# Patient Record
Sex: MALE | Race: WHITE | NOT HISPANIC OR LATINO | Employment: UNEMPLOYED | URBAN - NONMETROPOLITAN AREA
[De-identification: names, ages, dates, MRNs, and addresses within clinical notes are randomized per-mention and may not be internally consistent; named-entity substitution may affect disease eponyms.]

---

## 2020-03-09 ENCOUNTER — HOSPITAL ENCOUNTER (EMERGENCY)
Facility: HOSPITAL | Age: 25
Discharge: HOME | End: 2020-03-09
Attending: STUDENT IN AN ORGANIZED HEALTH CARE EDUCATION/TRAINING PROGRAM | Admitting: EMERGENCY MEDICINE

## 2020-03-09 VITALS
BODY MASS INDEX: 24.64 KG/M2 | SYSTOLIC BLOOD PRESSURE: 136 MMHG | HEIGHT: 74 IN | OXYGEN SATURATION: 96 % | WEIGHT: 192 LBS | DIASTOLIC BLOOD PRESSURE: 68 MMHG | RESPIRATION RATE: 19 BRPM | HEART RATE: 83 BPM | TEMPERATURE: 98.6 F

## 2020-03-09 DIAGNOSIS — S01.81XA CHIN LACERATION, INITIAL ENCOUNTER: Primary | ICD-10-CM

## 2020-03-09 PROCEDURE — 99282 EMERGENCY DEPT VISIT SF MDM: CPT

## 2020-03-09 RX ORDER — LIDOCAINE HYDROCHLORIDE 10 MG/ML
10 INJECTION, SOLUTION EPIDURAL; INFILTRATION; INTRACAUDAL; PERINEURAL ONCE
Status: COMPLETED | OUTPATIENT
Start: 2020-03-09 | End: 2020-03-09

## 2020-03-09 RX ADMIN — LIDOCAINE HYDROCHLORIDE 10 ML: 10 INJECTION, SOLUTION EPIDURAL; INFILTRATION; INTRACAUDAL; PERINEURAL at 19:30

## 2020-03-09 NOTE — ED TRIAGE NOTES
Pt was using a spring loaded ab roller that failed and hit him in the chin. Pt has a lac to chin approx 3x1 cm and complains of jaw pain. Bleeding controlled at this time.

## 2020-03-10 NOTE — ED PROVIDER NOTES
Subjective   24-year-old male presenting for laceration on the chin.  He was working out using a spring-loaded ab roller when the spring broke and he fell hitting his chin on the floor.  He applied pressure and minimal bleeding after 5 minutes.  He endorses chin and jaw pain that is 3/10 in intensity and aching in quality.          Review of Systems   Constitutional: Negative for chills and fever.   HENT: Negative for congestion, rhinorrhea and sinus pain.    Eyes: Negative for pain and redness.   Respiratory: Negative for cough and shortness of breath.    Cardiovascular: Negative for chest pain and palpitations.   Gastrointestinal: Negative for abdominal pain, nausea and vomiting.   Genitourinary: Negative for dysuria, flank pain and hematuria.   Musculoskeletal: Negative for arthralgias, joint swelling, myalgias and neck pain.   Skin: Positive for wound (Chin). Negative for color change, pallor and rash.   Neurological: Negative for light-headedness and headaches.       History reviewed. No pertinent past medical history.    No Known Allergies    History reviewed. No pertinent surgical history.    History reviewed. No pertinent family history.    Social History     Socioeconomic History   • Marital status: Single     Spouse name: Not on file   • Number of children: Not on file   • Years of education: Not on file   • Highest education level: Not on file   Tobacco Use   • Smoking status: Unknown If Ever Smoked           Objective   Physical Exam   Constitutional: He is oriented to person, place, and time. He appears well-developed and well-nourished. No distress.   HENT:   Head: Normocephalic. Head is with laceration.   3 cm laceration of the chin.   Eyes: Conjunctivae and EOM are normal.   Cardiovascular: Normal rate and regular rhythm.   Pulmonary/Chest: Effort normal and breath sounds normal.   Abdominal: Soft. Bowel sounds are normal. He exhibits no distension. There is no tenderness.   Musculoskeletal: Normal  range of motion. He exhibits no edema, tenderness or deformity.   Neurological: He is alert and oriented to person, place, and time.   Skin: He is not diaphoretic.   Nursing note and vitals reviewed.      Laceration Repair  Date/Time: 3/9/2020 7:34 PM  Performed by: James Morton MD  Authorized by: Jorge Alberto Holden MD     Consent:     Consent obtained:  Verbal    Consent given by:  Patient    Risks discussed:  Infection, need for additional repair, pain and poor cosmetic result  Anesthesia (see MAR for exact dosages):     Anesthesia method:  Local infiltration    Local anesthetic:  Lidocaine 1% w/o epi  Laceration details:     Location:  Face    Face location:  Chin    Length (cm):  3    Depth (mm):  1  Repair type:     Repair type:  Simple  Pre-procedure details:     Preparation:  Patient was prepped and draped in usual sterile fashion  Exploration:     Hemostasis achieved with:  Direct pressure    Wound extent: areolar tissue violated and fascia violated      Wound extent: no muscle damage noted, no nerve damage noted, no tendon damage noted and no vascular damage noted    Treatment:     Area cleansed with:  Saline    Amount of cleaning:  Standard    Irrigation method:  Pressure wash  Skin repair:     Repair method:  Sutures    Suture size:  4-0    Suture material:  Nylon    Suture technique:  Simple interrupted    Number of sutures:  5  Post-procedure details:     Dressing:  Antibiotic ointment               ED Course  ED Course as of Mar 09 2104   Mon Mar 09, 2020   1934 Wound cleaned with saline and chlorhexidine.  Lidocaine injected.  5 sutures placed.    [HM]      ED Course User Index  [HM] James Morton MD                                           [facility]  Number of Diagnoses or Management Options  Chin laceration, initial encounter:   Diagnosis management comments: 24-year-old male presenting for laceration of the chin.  Laceration approximately 3 cm long and 1 cm deep.  Area cleaned with  chlorhexidine and saline.  4-0 nylon sutures used to repair the laceration.  Patient given precautions to keep the area clean and to look for signs of infection.  Sutures to be removed in 7 to 10 days.  Patient to follow-up with PCP, offered for him to see me in clinic.      Final diagnoses:   Chin laceration, initial encounter            aJmes Morton MD  Resident  03/09/20 [id number]

## 2020-03-17 ENCOUNTER — OFFICE VISIT (OUTPATIENT)
Dept: FAMILY MEDICINE CLINIC | Facility: CLINIC | Age: 25
End: 2020-03-17

## 2020-03-17 VITALS
TEMPERATURE: 97.7 F | HEART RATE: 52 BPM | BODY MASS INDEX: 24.68 KG/M2 | SYSTOLIC BLOOD PRESSURE: 118 MMHG | WEIGHT: 192.3 LBS | HEIGHT: 74 IN | DIASTOLIC BLOOD PRESSURE: 80 MMHG | OXYGEN SATURATION: 100 %

## 2020-03-17 DIAGNOSIS — S01.81XA CHIN LACERATION, INITIAL ENCOUNTER: Primary | ICD-10-CM

## 2020-03-17 DIAGNOSIS — F32.9 REACTIVE DEPRESSION: ICD-10-CM

## 2020-03-17 PROCEDURE — 99203 OFFICE O/P NEW LOW 30 MIN: CPT | Performed by: STUDENT IN AN ORGANIZED HEALTH CARE EDUCATION/TRAINING PROGRAM

## 2020-03-17 NOTE — PROGRESS NOTES
Family Medicine Residency  James Morton MD    Subjective:     Tommy Barrett is a 24 y.o. male who presents for establish care and suture removal.  Patient was seen at the ED a week ago after suffering a laceration to his chin while working out.  He was given bacitracin cream and denies any signs of infection.  He is in [place] on court mandated rehab due to alcohol and multiple drug use.  He endorses symptoms of major depression.  States the symptoms are related to the fact that he was arrested 8 months ago is now in rehab.  He lost his house and job in [place] and is uncertain of what work you will do afterwards.  He is currently at [facility] and sees a therapist there that he likes.    PHQ-9 Depression Screening  Little interest or pleasure in doing things? 3   Feeling down, depressed, or hopeless? 3   Trouble falling or staying asleep, or sleeping too much? 3   Feeling tired or having little energy? 3   Poor appetite or overeating? 0   Feeling bad about yourself - or that you are a failure or have let yourself or your family down? 3   Trouble concentrating on things, such as reading the newspaper or watching television? 3   Moving or speaking so slowly that other people could have noticed? Or the opposite - being so fidgety or restless that you have been moving around a lot more than usual? 3   Thoughts that you would be better off dead, or of hurting yourself in some way? 3   PHQ-9 Total Score 24   If you checked off any problems, how difficult have these problems made it for you to do your work, take care of things at home, or get along with other people? Extremely dIfficult         The following portions of the patient's history were reviewed and updated as appropriate: allergies, current medications, past family history, past medical history, past social history, past surgical history and problem list.    Past Medical Hx:  Past Medical History:   Diagnosis Date   • ADHD (attention deficit  hyperactivity disorder)        Past Surgical Hx:  Past Surgical History:   Procedure Laterality Date   • WISDOM TOOTH EXTRACTION         Current Meds:  No current outpatient medications on file.    Allergies:  No Known Allergies    Family Hx:  Family History   Problem Relation Age of Onset   • No Known Problems Mother    • No Known Problems Father    • No Known Problems Sister    • No Known Problems Brother    • No Known Problems Daughter    • No Known Problems Son    • No Known Problems Maternal Aunt    • No Known Problems Maternal Uncle    • No Known Problems Paternal Aunt    • No Known Problems Paternal Uncle    • Stroke Maternal Grandmother    • No Known Problems Maternal Grandfather    • No Known Problems Paternal Grandmother    • No Known Problems Paternal Grandfather    • No Known Problems Other         Social History:  Social History     Socioeconomic History   • Marital status: Single     Spouse name: Not on file   • Number of children: Not on file   • Years of education: Not on file   • Highest education level: Not on file   Tobacco Use   • Smoking status: Never Smoker   • Smokeless tobacco: Never Used   Substance and Sexual Activity   • Alcohol use: Yes   • Drug use: Not Currently     Types: Marijuana   • Sexual activity: Not Currently       Review of Systems  Review of Systems   Constitutional: Negative for appetite change, chills, diaphoresis, fatigue, fever and unexpected weight change.   HENT: Negative for congestion, rhinorrhea, sinus pain and sore throat.    Eyes: Negative for pain and redness.   Respiratory: Negative for cough, chest tightness and shortness of breath.    Cardiovascular: Negative for chest pain, palpitations and leg swelling.   Gastrointestinal: Negative for abdominal pain, constipation, diarrhea, nausea and vomiting.   Endocrine: Negative for polydipsia, polyphagia and polyuria.   Genitourinary: Negative for difficulty urinating and dysuria.   Musculoskeletal: Negative for  "arthralgias, joint swelling and myalgias.   Skin: Positive for wound (chin laceration). Negative for color change, pallor and rash.   Neurological: Negative for dizziness, light-headedness and headaches.   Psychiatric/Behavioral: Positive for dysphoric mood and sleep disturbance. Negative for agitation and behavioral problems. The patient is not nervous/anxious.        Objective:     /80   Pulse 52   Temp 97.7 °F (36.5 °C) (Temporal)   Ht 188 cm (74\")   Wt 87.2 kg (192 lb 4.8 oz)   SpO2 100%   BMI 24.69 kg/m²   Physical Exam   Constitutional: He is oriented to person, place, and time. Vital signs are normal. He appears well-developed and well-nourished. No distress.   HENT:   Head: Normocephalic. Head is with laceration.   Right Ear: Hearing normal.   Left Ear: Hearing normal.   Eyes: Pupils are equal, round, and reactive to light. Conjunctivae, EOM and lids are normal.   Neck: Normal range of motion. Neck supple.   Cardiovascular: Normal rate, regular rhythm, S1 normal, S2 normal and normal heart sounds.   No murmur heard.  Pulmonary/Chest: Effort normal and breath sounds normal. No respiratory distress. He has no wheezes. He has no rales.   Abdominal: Soft. Normal appearance and bowel sounds are normal. He exhibits no distension. There is no tenderness. There is no guarding.   Musculoskeletal: Normal range of motion. He exhibits no edema, tenderness or deformity.   Lymphadenopathy:     He has no cervical adenopathy.   Neurological: He is alert and oriented to person, place, and time. He has normal strength. He is not disoriented.   Skin: Skin is warm and dry. Laceration noted. No rash noted. He is not diaphoretic. No erythema. No pallor.   Psychiatric: He has a normal mood and affect. His speech is normal and behavior is normal. Cognition and memory are normal.        Assessment/Plan:     Tommy was seen today for establish care and suture / staple removal.    Diagnoses and all orders for this " visit:    Chin laceration, initial encounter  -3 cm laceration occurred on 3/9 due to an injury during working out.  5 sutures removed today.  Wound healing well no signs of infection.  Advised patient continue using bacitracin cream for the rest of the week.    Reactive depression  -PHQ 9 score of 24.  Depressive symptoms related to being arrested 8 months ago as well as alcohol and polysubstance drug abuse.  Has been sober for 8 months.  Symptoms related to no longer using drugs and dealing with the consequences of his actions.  Denies suicidal or homicidal ideation.  [facility]ing [facility].      · Rx changes: none  · Patient Education: depression symptoms  · Compliance at present is estimated to be good.      Follow-up:     Return in about 1 month (around 4/17/2020) for Laceration, Depression f/u.    Preventative:  Health Maintenance   Topic Date Due   • ANNUAL PHYSICAL  03/19/1996   • TDAP/TD VACCINES (1 - Tdap) 03/19/2004   • INFLUENZA VACCINE  08/01/2019   • HEPATITIS C SCREENING  Completed     Male Preventative: Exercises regularly  Recommended: DTaP  Vaccine Counseling: N/A    Weight  -Class: Normal: 18.5-24.9kg/m2  -Patient's Body mass index is 24.69 kg/m². BMI is within normal parameters. No follow-up required..   eat more fruits and vegetables, decrease soda or juice intake, increase water intake and increase physical activity    Alcohol use:  reports that he drinks alcohol.  Nicotine status  reports that he has never smoked. He has never used smokeless tobacco.    Goals    None         RISK SCORE: 2      James Morton M.D. PGY1  [facility]  [address] [place] [address]  Office: [phone]  This document has been electronically signed by James Morton MD on March 17, 2020 10:32

## 2020-03-19 NOTE — PROGRESS NOTES
I have seen the patient.  I have reviewed the notes, assessments, and/or procedures performed by James Morton MD, I concur with her/his documentation and assessment and plan for Tommy Barrett.               This document has been electronically signed by Humza Do MD on March 19, 2020 16:26